# Patient Record
Sex: MALE | Race: WHITE | NOT HISPANIC OR LATINO | URBAN - NONMETROPOLITAN AREA
[De-identification: names, ages, dates, MRNs, and addresses within clinical notes are randomized per-mention and may not be internally consistent; named-entity substitution may affect disease eponyms.]

---

## 2023-10-17 ENCOUNTER — OFFICE VISIT (OUTPATIENT)
Dept: FAMILY MEDICINE | Facility: CLINIC | Age: 58
End: 2023-10-17
Payer: MEDICARE

## 2023-10-17 VITALS
DIASTOLIC BLOOD PRESSURE: 105 MMHG | BODY MASS INDEX: 31.94 KG/M2 | OXYGEN SATURATION: 97 % | WEIGHT: 241 LBS | HEART RATE: 123 BPM | RESPIRATION RATE: 21 BRPM | HEIGHT: 73 IN | SYSTOLIC BLOOD PRESSURE: 143 MMHG

## 2023-10-17 DIAGNOSIS — R06.00 DYSPNEA, UNSPECIFIED TYPE: Primary | ICD-10-CM

## 2023-10-17 DIAGNOSIS — M79.89 LEG SWELLING: ICD-10-CM

## 2023-10-17 DIAGNOSIS — R14.0 ABDOMINAL BLOATING: ICD-10-CM

## 2023-10-17 DIAGNOSIS — R00.0 TACHYCARDIA: ICD-10-CM

## 2023-10-17 DIAGNOSIS — R06.01 ORTHOPNEA: ICD-10-CM

## 2023-10-17 PROBLEM — G25.81 RESTLESS LEGS SYNDROME: Status: ACTIVE | Noted: 2023-10-17

## 2023-10-17 PROBLEM — E78.5 HYPERLIPIDEMIA: Status: ACTIVE | Noted: 2023-10-17

## 2023-10-17 PROBLEM — I25.10 CORONARY ARTERY DISEASE INVOLVING NATIVE CORONARY ARTERY WITHOUT ANGINA PECTORIS: Status: ACTIVE | Noted: 2023-10-17

## 2023-10-17 PROCEDURE — 3077F SYST BP >= 140 MM HG: CPT | Mod: ,,, | Performed by: NURSE PRACTITIONER

## 2023-10-17 PROCEDURE — 99205 OFFICE O/P NEW HI 60 MIN: CPT | Mod: ,,, | Performed by: NURSE PRACTITIONER

## 2023-10-17 PROCEDURE — 93005 PR ELECTROCARDIOGRAM, TRACING: ICD-10-PCS | Mod: ,,, | Performed by: NURSE PRACTITIONER

## 2023-10-17 PROCEDURE — 3008F PR BODY MASS INDEX (BMI) DOCUMENTED: ICD-10-PCS | Mod: ,,, | Performed by: NURSE PRACTITIONER

## 2023-10-17 PROCEDURE — 3008F BODY MASS INDEX DOCD: CPT | Mod: ,,, | Performed by: NURSE PRACTITIONER

## 2023-10-17 PROCEDURE — 3080F DIAST BP >= 90 MM HG: CPT | Mod: ,,, | Performed by: NURSE PRACTITIONER

## 2023-10-17 PROCEDURE — 93010 PR ELECTROCARDIOGRAM REPORT: ICD-10-PCS | Mod: ,,, | Performed by: NURSE PRACTITIONER

## 2023-10-17 PROCEDURE — 93005 ELECTROCARDIOGRAM TRACING: CPT | Mod: ,,, | Performed by: NURSE PRACTITIONER

## 2023-10-17 PROCEDURE — 3077F PR MOST RECENT SYSTOLIC BLOOD PRESSURE >= 140 MM HG: ICD-10-PCS | Mod: ,,, | Performed by: NURSE PRACTITIONER

## 2023-10-17 PROCEDURE — 99205 PR OFFICE/OUTPT VISIT, NEW, LEVL V, 60-74 MIN: ICD-10-PCS | Mod: ,,, | Performed by: NURSE PRACTITIONER

## 2023-10-17 PROCEDURE — 93010 ELECTROCARDIOGRAM REPORT: CPT | Mod: ,,, | Performed by: NURSE PRACTITIONER

## 2023-10-17 PROCEDURE — 3080F PR MOST RECENT DIASTOLIC BLOOD PRESSURE >= 90 MM HG: ICD-10-PCS | Mod: ,,, | Performed by: NURSE PRACTITIONER

## 2023-10-17 NOTE — PROGRESS NOTES
"Subjective:       Patient ID: Philipp Millan is a 58 y.o. male.    Vitals:  height is 6' 1" (1.854 m) and weight is 109.3 kg (241 lb). His blood pressure is 143/105 (abnormal) and his pulse is 123 (abnormal). His respiration is 21 (abnormal) and oxygen saturation is 97%.     Chief Complaint: Leg Swelling (Both Legs swollen and stomach swelling started x2 weeks possible constap with no help from over the counter meds )    He endorses that he does have a history of cardiac disease and has had cardiac stents placed.  He also notes that he has not seen a cardiologist in about 5-6 years.  He is currently not taking any medications.  He voices concerns about his swelling and shortness of breath and no takes that he has gained 30+ lbs in 2 weeks. He has been taking OTC laxatives with no improvement.       Shortness of Breath  This is a new problem. The current episode started 1 to 4 weeks ago. The problem occurs constantly. The problem has been gradually worsening. The average episode lasts 2 weeks. Associated symptoms include abdominal pain, leg swelling and orthopnea. The symptoms are aggravated by lying flat. He has tried rest for the symptoms. His past medical history is significant for CAD.       Cardiovascular:  Positive for leg swelling.   Respiratory:  Positive for shortness of breath.    Gastrointestinal:  Positive for abdominal pain and abdominal bloating.         Objective:      Physical Exam  Cardiovascular:      Rate and Rhythm: Tachycardia present. Rhythm regularly irregular.   Abdominal:      General: There is distension.   Musculoskeletal:      Right lower le+ Pitting Edema present.              Assessment:       1. Abdominal bloating    2. Tachycardia          X-Ray KUB  Narrative: EXAMINATION:  XR KUB    CLINICAL HISTORY:  Abdominal distension (gaseous)    TECHNIQUE:  XR KUB    COMPARISON:  2019    FINDINGS:  Lower lobes are clear    There is no bowel obstruction.  No free air.  No renal " calculi.    Liver and renal silhouettes are normal.    No acute bone findings.  Impression: No bowel obstruction or renal calculi.  Mild colonic stool.    Electronically signed by: Kai Loera  Date:    10/17/2023  Time:    17:25       Plan:       Recommended transportation to emergency department via ambulance so that patient could undergo cardiac monitoring.  Patient refuses and wishes to allow caregiver to drive him to [facility] emergency department.  Report was called to Milagros at [facility] and EKG faxed.  Abdominal bloating  -     X-Ray KUB; Future; Expected date: 10/17/2023    Tachycardia  -     POCT EKG 12-LEAD (Manually Resulted by Ordering Provider)                   No follow-ups on file.     No future appointments.     An After Visit Summary was printed and given to the patient.     Signature:    JULIANA Chapman  Family Nurse Practitioner  [facility]    Date of encounter: 10/17/23

## 2023-11-24 ENCOUNTER — CLINICAL SUPPORT (OUTPATIENT)
Dept: FAMILY MEDICINE | Facility: CLINIC | Age: 58
End: 2023-11-24
Payer: MEDICARE

## 2023-11-24 DIAGNOSIS — R07.9 CHEST PAIN, UNSPECIFIED TYPE: ICD-10-CM

## 2023-11-24 DIAGNOSIS — R06.02 SHORTNESS OF BREATH: Primary | ICD-10-CM

## 2023-11-24 NOTE — PROGRESS NOTES
"Pt at the  stating he has chest pain and shortness of breath, unable to urinate.  Is in the w/c accompanied by family member/care giver.  States was recently in hospital and had to have "fluid removed from my heart". States feels as though that fluid is coming back.  Advised pt that cannot remove fluid here, additionally, we will usually will not get imaging or labs back in the same day to adequately assess his concern and recommended he return to ED. Pt declined to come to a room to have VS checked to see if he was stable for POV transport. This was witnessed by  staff. Pt and caregiver states they will leave and go directly to ED for urgent evaluation.     "

## 2025-06-27 ENCOUNTER — PATIENT OUTREACH (OUTPATIENT)
Facility: HOSPITAL | Age: 60
End: 2025-06-27
Payer: MEDICARE

## 2025-06-27 NOTE — PROGRESS NOTES
Population Health Chart Review & Patient Outreach Details    Updates Requested / Reviewed:  [x]  Care Everywhere Updated & Reviewed      Health Maintenance Topics Addressed and Outreach Outcomes / Actions Taken:   [x] Called pt. No answer. No v/m. Need to know who PCP currently is. If not an [facility] provider, then pt needs to call [employer] and let them know.